# Patient Record
Sex: MALE | URBAN - METROPOLITAN AREA
[De-identification: names, ages, dates, MRNs, and addresses within clinical notes are randomized per-mention and may not be internally consistent; named-entity substitution may affect disease eponyms.]

---

## 2022-02-08 ENCOUNTER — OFFICE VISIT (OUTPATIENT)
Dept: URBAN - METROPOLITAN AREA CLINIC 70 | Facility: CLINIC | Age: 62
End: 2022-02-08

## 2022-02-11 ENCOUNTER — OFFICE VISIT (OUTPATIENT)
Dept: URBAN - METROPOLITAN AREA CLINIC 70 | Facility: CLINIC | Age: 62
End: 2022-02-11

## 2022-02-11 RX ORDER — PANTOPRAZOLE SODIUM 40 MG/1
1 TABLET TABLET, DELAYED RELEASE ORAL ONCE A DAY
Status: ACTIVE | COMMUNITY

## 2022-02-11 RX ORDER — SUCRALFATE 1 G
1 TABLET ON AN EMPTY STOMACH TABLET ORAL TWICE A DAY
Status: ACTIVE | COMMUNITY

## 2022-02-11 RX ORDER — OMEPRAZOLE 20 MG/1
1 CAPSULE 30 MINUTES BEFORE MORNING MEAL CAPSULE, DELAYED RELEASE ORAL ONCE A DAY
Status: ACTIVE | COMMUNITY

## 2022-02-11 NOTE — HPI-TODAY'S VISIT:
Patient was referred by Hosea Frias MD for an evaluation of abdominal pain and colon cancer screening. A copy of this note will be sent to the referring provider.  Labs 10/28/2021 showed Amylase 133 (HIGH).  11/10/2021 showed amylase 158 (HIGH). 1/20/2022 showed mylase 165 (HIGH).

## 2022-02-23 ENCOUNTER — LAB OUTSIDE AN ENCOUNTER (OUTPATIENT)
Dept: URBAN - METROPOLITAN AREA CLINIC 70 | Facility: CLINIC | Age: 62
End: 2022-02-23

## 2022-02-23 ENCOUNTER — CLAIMS CREATED FROM THE CLAIM WINDOW (OUTPATIENT)
Dept: URBAN - METROPOLITAN AREA CLINIC 70 | Facility: CLINIC | Age: 62
End: 2022-02-23
Payer: COMMERCIAL

## 2022-02-23 ENCOUNTER — WEB ENCOUNTER (OUTPATIENT)
Dept: URBAN - METROPOLITAN AREA CLINIC 70 | Facility: CLINIC | Age: 62
End: 2022-02-23

## 2022-02-23 VITALS
WEIGHT: 144.7 LBS | BODY MASS INDEX: 23.25 KG/M2 | HEART RATE: 56 BPM | DIASTOLIC BLOOD PRESSURE: 84 MMHG | HEIGHT: 66 IN | TEMPERATURE: 97.9 F | SYSTOLIC BLOOD PRESSURE: 146 MMHG

## 2022-02-23 DIAGNOSIS — Z12.11 COLON CANCER SCREENING: ICD-10-CM

## 2022-02-23 DIAGNOSIS — R74.8 ELEVATED AMYLASE: ICD-10-CM

## 2022-02-23 DIAGNOSIS — R10.13 EPIGASTRIC ABDOMINAL PAIN: ICD-10-CM

## 2022-02-23 PROBLEM — 275739007: Status: ACTIVE | Noted: 2022-02-23

## 2022-02-23 PROCEDURE — 99203 OFFICE O/P NEW LOW 30 MIN: CPT

## 2022-02-23 RX ORDER — PANTOPRAZOLE SODIUM 40 MG/1
1 TABLET TABLET, DELAYED RELEASE ORAL ONCE A DAY
Status: ACTIVE | COMMUNITY

## 2022-02-23 RX ORDER — PANTOPRAZOLE SODIUM 40 MG/1
TAKE 1 TABLET IN THE MORNING ON AN EMPTY STOMACH, WAIT 30 MINUTES, THEN START EATING TABLET, DELAYED RELEASE ORAL ONCE A DAY
Qty: 90 | Refills: 3

## 2022-02-23 RX ORDER — SUCRALFATE 1 G/1
TAKE ONE TABLET BY MOUTH THREE TIMES DAILY DIAGNOSIS UNAVAILABLE TABLET ORAL
OUTPATIENT

## 2022-02-23 RX ORDER — SUCRALFATE 1 G/1
TAKE ONE TABLET BY MOUTH THREE TIMES DAILY DIAGNOSIS UNAVAILABLE TABLET ORAL
Qty: 90 | Refills: 0 | Status: ACTIVE | COMMUNITY

## 2022-02-23 RX ORDER — PRAVASTATIN SODIUM 40 MG/1
TAKE ONE TABLET BY MOUTH AT BEDTIME DIAGNOSIS UNAVAILABLE TABLET ORAL
Qty: 90 | Refills: 0 | Status: ACTIVE | COMMUNITY

## 2022-02-23 NOTE — HPI-TODAY'S VISIT:
Patient was referred by Hosea Frias MD for an evaluation of abdominal pain and colon cancer screening. A copy of this note will be sent to the referring provider.  Pt presents for epigastric abdominal pain.  Labs 10/28/2021 showed Amylase 133 (HIGH).  11/10/2021 showed amylase 158 (HIGH). 1/20/2022 showed Amylase 165 (HIGH). Lipase, triglycerides, and LFTs all WNL.  CT 11/16/2021 showed a 1.2 flash filing hepatic hemangioma on the right hepatic lobe. Pancreas and gallbladder were unremarkable.  Today pt states pantoprazole and sucralfate are mildly improving symptoms.  He has been taking his PPI at night.  He states that dairy products trigger burning epigastric abdominal pain.  He has lost 30lbs since the onset of symptoms in 11/2021.  He denies dysphagia, nausea, vomiting, hematemesis, melena, or associated regurgitation.   He has never had a colonoscopy.  He denies a fhx of colon cancer, constipation, diarrhea, or rectal bleeding.  Of note, pt speaks Beninese with very little English.  His son is present for the office visit and provides the entire history and translation for the pt.

## 2022-02-23 NOTE — PHYSICAL EXAM GASTROINTESTINAL
Abdomen,  soft, moderate epigastric TTP, nondistended,  no guarding or rigidity,  no masses palpable,  normal bowel sounds

## 2022-03-29 ENCOUNTER — TELEPHONE ENCOUNTER (OUTPATIENT)
Dept: URBAN - METROPOLITAN AREA CLINIC 92 | Facility: CLINIC | Age: 62
End: 2022-03-29

## 2022-03-29 ENCOUNTER — OFFICE VISIT (OUTPATIENT)
Dept: URBAN - METROPOLITAN AREA SURGERY CENTER 24 | Facility: SURGERY CENTER | Age: 62
End: 2022-03-29
Payer: COMMERCIAL

## 2022-03-29 DIAGNOSIS — D12.5 ADENOMA OF SIGMOID COLON: ICD-10-CM

## 2022-03-29 DIAGNOSIS — K21.9 ACID REFLUX: ICD-10-CM

## 2022-03-29 DIAGNOSIS — K29.60 ADENOPAPILLOMATOSIS GASTRICA: ICD-10-CM

## 2022-03-29 DIAGNOSIS — Z12.11 COLON CANCER SCREENING: ICD-10-CM

## 2022-03-29 PROCEDURE — G8907 PT DOC NO EVENTS ON DISCHARG: HCPCS | Performed by: INTERNAL MEDICINE

## 2022-03-29 PROCEDURE — 43239 EGD BIOPSY SINGLE/MULTIPLE: CPT | Performed by: INTERNAL MEDICINE

## 2022-03-29 PROCEDURE — 45385 COLONOSCOPY W/LESION REMOVAL: CPT | Performed by: INTERNAL MEDICINE

## 2022-03-29 RX ORDER — PRAVASTATIN SODIUM 40 MG/1
TAKE ONE TABLET BY MOUTH AT BEDTIME DIAGNOSIS UNAVAILABLE TABLET ORAL
Qty: 90 | Refills: 0 | Status: ACTIVE | COMMUNITY

## 2022-03-29 RX ORDER — PANTOPRAZOLE SODIUM 40 MG/1
1 TABLET TABLET, DELAYED RELEASE ORAL ONCE A DAY
Qty: 90 TABLET | Refills: 0 | OUTPATIENT
Start: 2022-03-29

## 2022-03-29 RX ORDER — SUCRALFATE 1 G/1
TAKE ONE TABLET BY MOUTH THREE TIMES DAILY DIAGNOSIS UNAVAILABLE TABLET ORAL
Status: ACTIVE | COMMUNITY

## 2022-03-29 RX ORDER — PANTOPRAZOLE SODIUM 40 MG/1
TAKE 1 TABLET IN THE MORNING ON AN EMPTY STOMACH, WAIT 30 MINUTES, THEN START EATING TABLET, DELAYED RELEASE ORAL ONCE A DAY
Qty: 90 | Refills: 3 | Status: ACTIVE | COMMUNITY

## 2022-04-13 ENCOUNTER — OFFICE VISIT (OUTPATIENT)
Dept: URBAN - METROPOLITAN AREA CLINIC 70 | Facility: CLINIC | Age: 62
End: 2022-04-13
Payer: COMMERCIAL

## 2022-04-13 VITALS
HEART RATE: 56 BPM | TEMPERATURE: 97.5 F | HEIGHT: 66 IN | DIASTOLIC BLOOD PRESSURE: 83 MMHG | BODY MASS INDEX: 23.69 KG/M2 | SYSTOLIC BLOOD PRESSURE: 134 MMHG | WEIGHT: 147.4 LBS

## 2022-04-13 DIAGNOSIS — K21.00 GASTROESOPHAGEAL REFLUX DISEASE WITH ESOPHAGITIS WITHOUT HEMORRHAGE: ICD-10-CM

## 2022-04-13 PROCEDURE — 99213 OFFICE O/P EST LOW 20 MIN: CPT

## 2022-04-13 RX ORDER — PANTOPRAZOLE SODIUM 40 MG/1
TAKE 1 TABLET IN THE MORNING ON AN EMPTY STOMACH, WAIT 30 MINUTES, THEN START EATING TABLET, DELAYED RELEASE ORAL ONCE A DAY
Qty: 90 | Refills: 3 | Status: ACTIVE | COMMUNITY

## 2022-04-13 RX ORDER — SUCRALFATE 1 G/1
TAKE ONE TABLET BY MOUTH THREE TIMES DAILY DIAGNOSIS UNAVAILABLE TABLET ORAL
OUTPATIENT

## 2022-04-13 RX ORDER — SUCRALFATE 1 G/1
TAKE ONE TABLET BY MOUTH THREE TIMES DAILY DIAGNOSIS UNAVAILABLE TABLET ORAL
Status: ACTIVE | COMMUNITY

## 2022-04-13 RX ORDER — PRAVASTATIN SODIUM 40 MG/1
TAKE ONE TABLET BY MOUTH AT BEDTIME DIAGNOSIS UNAVAILABLE TABLET ORAL
Qty: 90 | Refills: 0 | Status: ACTIVE | COMMUNITY

## 2022-04-13 RX ORDER — PANTOPRAZOLE SODIUM 40 MG/1
TAKE 1 TABLET IN THE MORNING ON AN EMPTY STOMACH, WAIT 30 MINUTES, THEN START EATING TABLET, DELAYED RELEASE ORAL ONCE A DAY
OUTPATIENT

## 2022-04-13 NOTE — HPI-TODAY'S VISIT:
Pt presents for a procedure f/u.  EGD 3/29/2022 showed LA grade A esophagitis, small hiatal hernia, and gastritis. Neg. for h. pylori or dysplasia. Colon 3/29/2022 showed a 8mm TA in the sigmoid colon.  Recall 5 years (2027). Today he states he is feeling well.  Indigestion and epigastric abdominal pain have improved with Pantoprazole and carafate.  He denies dysphagia, nausea, vomiting, hematemesis, melena, constipation or diarrhea.   His son is present for the OV and is the  for the pt.

## 2022-04-13 NOTE — PHYSICAL EXAM GASTROINTESTINAL
Abdomen,  soft, mild epigastric TTP, nondistended,  no guarding or rigidity,  no masses palpable,  normal bowel sounds

## 2022-04-13 NOTE — HPI-OTHER HISTORIES
OV from 2/23/2022:  Patient was referred by Hosea Frias MD for an evaluation of abdominal pain and colon cancer screening. A copy of this note will be sent to the referring provider.  Pt presents for epigastric abdominal pain.  Labs 10/28/2021 showed Amylase 133 (HIGH).  11/10/2021 showed amylase 158 (HIGH). 1/20/2022 showed Amylase 165 (HIGH). Lipase, triglycerides, and LFTs all WNL.  CT 11/16/2021 showed a 1.2 flash filing hepatic hemangioma on the right hepatic lobe. Pancreas and gallbladder were unremarkable.  Today pt states pantoprazole and sucralfate are mildly improving symptoms.  He has been taking his PPI at night.  He states that dairy products trigger burning epigastric abdominal pain.  He has lost 30lbs since the onset of symptoms in 11/2021.  He denies dysphagia, nausea, vomiting, hematemesis, melena, or associated regurgitation.   He has never had a colonoscopy.  He denies a fhx of colon cancer, constipation, diarrhea, or rectal bleeding.  Of note, pt speaks Turkmen with very little English.  His son is present for the office visit and provides the entire history and translation for the pt.

## 2022-08-11 ENCOUNTER — TELEPHONE ENCOUNTER (OUTPATIENT)
Dept: URBAN - METROPOLITAN AREA CLINIC 70 | Facility: CLINIC | Age: 62
End: 2022-08-11

## 2022-08-11 RX ORDER — PANTOPRAZOLE SODIUM 40 MG/1
TAKE 1 TABLET IN THE MORNING ON AN EMPTY STOMACH, WAIT 30 MINUTES, THEN START EATING TABLET, DELAYED RELEASE ORAL ONCE A DAY
Qty: 90 | Refills: 3

## 2022-08-12 ENCOUNTER — OFFICE VISIT (OUTPATIENT)
Dept: URBAN - METROPOLITAN AREA CLINIC 70 | Facility: CLINIC | Age: 62
End: 2022-08-12

## 2022-09-07 ENCOUNTER — OFFICE VISIT (OUTPATIENT)
Dept: URBAN - METROPOLITAN AREA CLINIC 70 | Facility: CLINIC | Age: 62
End: 2022-09-07

## 2022-09-07 ENCOUNTER — OFFICE VISIT (OUTPATIENT)
Dept: URBAN - METROPOLITAN AREA CLINIC 70 | Facility: CLINIC | Age: 62
End: 2022-09-07
Payer: COMMERCIAL

## 2022-09-07 VITALS
WEIGHT: 157 LBS | HEIGHT: 66 IN | TEMPERATURE: 98.7 F | SYSTOLIC BLOOD PRESSURE: 131 MMHG | DIASTOLIC BLOOD PRESSURE: 87 MMHG | HEART RATE: 55 BPM | BODY MASS INDEX: 25.23 KG/M2

## 2022-09-07 DIAGNOSIS — K21.00 GASTROESOPHAGEAL REFLUX DISEASE WITH ESOPHAGITIS WITHOUT HEMORRHAGE: ICD-10-CM

## 2022-09-07 PROBLEM — 266433003: Status: ACTIVE | Noted: 2022-04-13

## 2022-09-07 PROCEDURE — 99214 OFFICE O/P EST MOD 30 MIN: CPT

## 2022-09-07 RX ORDER — PANTOPRAZOLE SODIUM 40 MG/1
TAKE 1 TABLET IN THE MORNING ON AN EMPTY STOMACH, WAIT 30 MINUTES, THEN START EATING TABLET, DELAYED RELEASE ORAL ONCE A DAY
Qty: 90 | Refills: 3 | Status: ACTIVE | COMMUNITY

## 2022-09-07 RX ORDER — SUCRALFATE 1 G/1
TAKE ONE TABLET BY MOUTH THREE TIMES DAILY DIAGNOSIS UNAVAILABLE TABLET ORAL
COMMUNITY

## 2022-09-07 RX ORDER — PANTOPRAZOLE SODIUM 40 MG/1
TAKE 1 TABLET IN THE MORNING ON AN EMPTY STOMACH, WAIT 30 MINUTES, THEN START EATING TABLET, DELAYED RELEASE ORAL ONCE A DAY
OUTPATIENT

## 2022-09-07 RX ORDER — PANTOPRAZOLE SODIUM 40 MG/1
TAKE 1 TABLET IN THE MORNING ON AN EMPTY STOMACH, WAIT 30 MINUTES, THEN START EATING TABLET, DELAYED RELEASE ORAL ONCE A DAY
Qty: 90 | Refills: 3 | COMMUNITY

## 2022-09-07 RX ORDER — PRAVASTATIN SODIUM 40 MG/1
TAKE ONE TABLET BY MOUTH AT BEDTIME DIAGNOSIS UNAVAILABLE TABLET ORAL
Qty: 90 | Refills: 0 | COMMUNITY

## 2022-09-07 NOTE — HPI-OTHER HISTORIES
OV 4/13/2022: Pt presents for a procedure f/u.  EGD 3/29/2022 showed LA grade A esophagitis, small hiatal hernia, and gastritis. Neg. for h. pylori or dysplasia. Colon 3/29/2022 showed a 8mm TA in the sigmoid colon.  Recall 5 years (2027). Today he states he is feeling well.  Indigestion and epigastric abdominal pain have improved with Pantoprazole and carafate.  He denies dysphagia, nausea, vomiting, hematemesis, melena, constipation or diarrhea.   His son is present for the OV and is the  for the pt. ------------------------------------------------- OV from 2/23/2022:  Patient was referred by Hosea Frias MD for an evaluation of abdominal pain and colon cancer screening. A copy of this note will be sent to the referring provider.  Pt presents for epigastric abdominal pain.  Labs 10/28/2021 showed Amylase 133 (HIGH).  11/10/2021 showed amylase 158 (HIGH). 1/20/2022 showed Amylase 165 (HIGH). Lipase, triglycerides, and LFTs all WNL.  CT 11/16/2021 showed a 1.2 flash filing hepatic hemangioma on the right hepatic lobe. Pancreas and gallbladder were unremarkable.  Today pt states pantoprazole and sucralfate are mildly improving symptoms.  He has been taking his PPI at night.  He states that dairy products trigger burning epigastric abdominal pain.  He has lost 30lbs since the onset of symptoms in 11/2021.  He denies dysphagia, nausea, vomiting, hematemesis, melena, or associated regurgitation.   He has never had a colonoscopy.  He denies a fhx of colon cancer, constipation, diarrhea, or rectal bleeding.  Of note, pt speaks Kazakh with very little English.  His son is present for the office visit and provides the entire history and translation for the pt.

## 2022-09-07 NOTE — HPI-TODAY'S VISIT:
The pt presents for a f/u for GERD.  He presents with his son who translates for the pt.  Today he states GERD symptoms are well managed with Pantoprazole 40mg.  He denies dysphagia, nausea, vomiting, or regurgitation.

## 2023-12-11 ENCOUNTER — OFFICE VISIT (OUTPATIENT)
Dept: URBAN - METROPOLITAN AREA CLINIC 70 | Facility: CLINIC | Age: 63
End: 2023-12-11
Payer: COMMERCIAL

## 2023-12-11 ENCOUNTER — LAB OUTSIDE AN ENCOUNTER (OUTPATIENT)
Dept: URBAN - METROPOLITAN AREA CLINIC 70 | Facility: CLINIC | Age: 63
End: 2023-12-11

## 2023-12-11 VITALS
SYSTOLIC BLOOD PRESSURE: 150 MMHG | HEART RATE: 58 BPM | TEMPERATURE: 97.7 F | BODY MASS INDEX: 24.33 KG/M2 | DIASTOLIC BLOOD PRESSURE: 92 MMHG | WEIGHT: 151.4 LBS | HEIGHT: 66 IN

## 2023-12-11 DIAGNOSIS — R10.13 EPIGASTRIC PAIN: ICD-10-CM

## 2023-12-11 DIAGNOSIS — K21.00 GASTROESOPHAGEAL REFLUX DISEASE WITH ESOPHAGITIS WITHOUT HEMORRHAGE: ICD-10-CM

## 2023-12-11 DIAGNOSIS — Z86.010 HISTORY OF COLON POLYPS: ICD-10-CM

## 2023-12-11 PROBLEM — 428283002: Status: ACTIVE | Noted: 2023-12-11

## 2023-12-11 PROCEDURE — 99214 OFFICE O/P EST MOD 30 MIN: CPT | Performed by: NURSE PRACTITIONER

## 2023-12-11 RX ORDER — FENOFIBRATE 145 MG/1
1 TABLET TABLET, FILM COATED ORAL ONCE A DAY
Status: ACTIVE | COMMUNITY

## 2023-12-11 RX ORDER — PANTOPRAZOLE SODIUM 40 MG/1
TAKE 1 TABLET IN THE MORNING ON AN EMPTY STOMACH, WAIT 30 MINUTES, THEN START EATING TABLET, DELAYED RELEASE ORAL ONCE A DAY
Status: ACTIVE | COMMUNITY

## 2023-12-11 RX ORDER — PANTOPRAZOLE SODIUM 40 MG/1
1 TABLET TABLET, DELAYED RELEASE ORAL TWICE A DAY
Qty: 180 TABLET | Refills: 3

## 2023-12-11 RX ORDER — PRAVASTATIN SODIUM 40 MG/1
TAKE ONE TABLET BY MOUTH AT BEDTIME DIAGNOSIS UNAVAILABLE TABLET ORAL
Qty: 90 | Refills: 0 | Status: ACTIVE | COMMUNITY

## 2023-12-11 NOTE — HPI-TODAY'S VISIT:
OV from 2/23/2022 Karin RICHTER: Patient was referred by Hosea Frias MD for an evaluation of abdominal pain and colon cancer screening. A copy of this note will be sent to the referring provider. Pt presents for epigastric abdominal pain. Labs 10/28/2021 showed Amylase 133 (HIGH). 11/10/2021 showed amylase 158 (HIGH). 1/20/2022 showed Amylase 165 (HIGH). Lipase, triglycerides, and LFTs all WNL. CT 11/16/2021 showed a 1.2 flash filing hepatic hemangioma on the right hepatic lobe. Pancreas and gallbladder were unremarkable. Today pt states pantoprazole and sucralfate are mildly improving symptoms. He has been taking his PPI at night. He states that dairy products trigger burning epigastric abdominal pain. He has lost 30lbs since the onset of symptoms in 11/2021. He denies dysphagia, nausea, vomiting, hematemesis, melena, or associated regurgitation. He has never had a colonoscopy. He denies a fhx of colon cancer, constipation, diarrhea, or rectal bleeding. Of note, pt speaks Swiss with very little English. His son is present for the office visit and provides the entire history and translation for the pt. ---------------------------------------- OV 4/13/2022 Karin RICHTER: Pt presents for a procedure f/u. EGD 3/29/2022 showed LA grade A esophagitis, small hiatal hernia, and gastritis. Neg. for h. pylori or dysplasia. Colon 3/29/2022 showed a 8mm TA in the sigmoid colon. Recall 5 years (2027). Today he states he is feeling well. Indigestion and epigastric abdominal pain have improved with Pantoprazole and carafate. He denies dysphagia, nausea, vomiting, hematemesis, melena, constipation or diarrhea. His son is present for the OV and is the  for the pt. ------------------------------------------------- OV 9/7/22 Karin RICHTER: The pt presents for a f/u for GERD.  He presents with his son who translates for the pt.  Today he states GERD symptoms are well managed with Pantoprazole 40mg.  He denies dysphagia, nausea, vomiting, or regurgitation. ---------------------------- Today 12/11/23: Patient presents today accompanied by his son for epigastric pain. Pain has been present x 2 years. CT 2021 unremarkable. EGD showing GERD however pain has not improved on PPI. Pain is postprandial. No alcohol or NSAIDs. Denies fever, N/V, dysphagia, wt loss, LGI symptoms, or signs of GI bleeding.

## 2023-12-29 ENCOUNTER — TELEPHONE ENCOUNTER (OUTPATIENT)
Dept: URBAN - METROPOLITAN AREA CLINIC 70 | Facility: CLINIC | Age: 63
End: 2023-12-29

## 2024-01-02 ENCOUNTER — TELEPHONE ENCOUNTER (OUTPATIENT)
Dept: URBAN - METROPOLITAN AREA CLINIC 70 | Facility: CLINIC | Age: 64
End: 2024-01-02

## 2024-01-22 ENCOUNTER — OFFICE VISIT (OUTPATIENT)
Dept: URBAN - METROPOLITAN AREA CLINIC 70 | Facility: CLINIC | Age: 64
End: 2024-01-22
Payer: COMMERCIAL

## 2024-01-22 VITALS
TEMPERATURE: 97.6 F | BODY MASS INDEX: 23.59 KG/M2 | DIASTOLIC BLOOD PRESSURE: 85 MMHG | SYSTOLIC BLOOD PRESSURE: 145 MMHG | HEIGHT: 66 IN | HEART RATE: 54 BPM | WEIGHT: 146.8 LBS

## 2024-01-22 DIAGNOSIS — K21.00 GASTROESOPHAGEAL REFLUX DISEASE WITH ESOPHAGITIS WITHOUT HEMORRHAGE: ICD-10-CM

## 2024-01-22 DIAGNOSIS — Z86.010 HISTORY OF COLON POLYPS: ICD-10-CM

## 2024-01-22 DIAGNOSIS — K82.8 BILIARY DYSKINESIA: ICD-10-CM

## 2024-01-22 DIAGNOSIS — R10.13 EPIGASTRIC PAIN: ICD-10-CM

## 2024-01-22 PROCEDURE — 99214 OFFICE O/P EST MOD 30 MIN: CPT | Performed by: NURSE PRACTITIONER

## 2024-01-22 RX ORDER — FENOFIBRATE 145 MG/1
1 TABLET TABLET, FILM COATED ORAL ONCE A DAY
Status: ACTIVE | COMMUNITY

## 2024-01-22 RX ORDER — PANTOPRAZOLE SODIUM 40 MG/1
1 TABLET TABLET, DELAYED RELEASE ORAL ONCE A DAY
OUTPATIENT

## 2024-01-22 RX ORDER — PRAVASTATIN SODIUM 40 MG/1
TAKE ONE TABLET BY MOUTH AT BEDTIME DIAGNOSIS UNAVAILABLE TABLET ORAL
Qty: 90 | Refills: 0 | Status: ON HOLD | COMMUNITY

## 2024-01-22 RX ORDER — PANTOPRAZOLE SODIUM 40 MG/1
1 TABLET TABLET, DELAYED RELEASE ORAL TWICE A DAY
Qty: 180 TABLET | Refills: 3 | Status: ON HOLD | COMMUNITY

## 2024-01-22 NOTE — HPI-TODAY'S VISIT:
OV from 2/23/2022 Karin RICHTER: Patient was referred by Hosea Frias MD for an evaluation of abdominal pain and colon cancer screening. A copy of this note will be sent to the referring provider. Pt presents for epigastric abdominal pain. Labs 10/28/2021 showed Amylase 133 (HIGH). 11/10/2021 showed amylase 158 (HIGH). 1/20/2022 showed Amylase 165 (HIGH). Lipase, triglycerides, and LFTs all WNL. CT 11/16/2021 showed a 1.2 flash filing hepatic hemangioma on the right hepatic lobe. Pancreas and gallbladder were unremarkable. Today pt states pantoprazole and sucralfate are mildly improving symptoms. He has been taking his PPI at night. He states that dairy products trigger burning epigastric abdominal pain. He has lost 30lbs since the onset of symptoms in 11/2021. He denies dysphagia, nausea, vomiting, hematemesis, melena, or associated regurgitation. He has never had a colonoscopy. He denies a fhx of colon cancer, constipation, diarrhea, or rectal bleeding. Of note, pt speaks Sierra Leonean with very little English. His son is present for the office visit and provides the entire history and translation for the pt. ---------------------------------------- OV 4/13/2022 Karin RICHTER: Pt presents for a procedure f/u. EGD 3/29/2022 showed LA grade A esophagitis, small hiatal hernia, and gastritis. Neg. for h. pylori or dysplasia. Colon 3/29/2022 showed a 8mm TA in the sigmoid colon. Recall 5 years (2027). Today he states he is feeling well. Indigestion and epigastric abdominal pain have improved with Pantoprazole and carafate. He denies dysphagia, nausea, vomiting, hematemesis, melena, constipation or diarrhea. His son is present for the OV and is the  for the pt. ------------------------------------------------- OV 9/7/22 Karin RICHTER: The pt presents for a f/u for GERD.  He presents with his son who translates for the pt.  Today he states GERD symptoms are well managed with Pantoprazole 40mg.  He denies dysphagia, nausea, vomiting, or regurgitation. ---------------------------- OV 12/11/23: Patient presents today accompanied by his son for epigastric pain. Pain has been present x 2 years. CT 2021 unremarkable. EGD showing GERD however pain has not improved on PPI. Pain is postprandial. No alcohol or NSAIDs. Denies fever, N/V, dysphagia, wt loss, LGI symptoms, or signs of GI bleeding. ---------------------------- Today 1/22/24: Patient presents today for follow up accomanied by tim to assist with translation. Abdominal u/s 12/11/23 showed mild fatty liver (gallbladder normal). HIDA scan 12/19/23 showed biliary dyskinesia with EF 24%. Results discussed with patient/family. Prior referral was placed to surgery (Dr Schuyler Jorge). Depite inceasing PPI to BID pt still has epigastric pain. No new GI complaints. Denies fever, N/V, wt loss, or LGI symptoms.

## 2024-04-23 ENCOUNTER — OV EP (OUTPATIENT)
Dept: URBAN - METROPOLITAN AREA CLINIC 70 | Facility: CLINIC | Age: 64
End: 2024-04-23
Payer: COMMERCIAL

## 2024-04-23 ENCOUNTER — LAB (OUTPATIENT)
Dept: URBAN - METROPOLITAN AREA CLINIC 70 | Facility: CLINIC | Age: 64
End: 2024-04-23

## 2024-04-23 VITALS
BODY MASS INDEX: 22.21 KG/M2 | TEMPERATURE: 97.6 F | DIASTOLIC BLOOD PRESSURE: 83 MMHG | HEIGHT: 66 IN | WEIGHT: 138.2 LBS | HEART RATE: 55 BPM | SYSTOLIC BLOOD PRESSURE: 121 MMHG

## 2024-04-23 DIAGNOSIS — K21.00 GASTROESOPHAGEAL REFLUX DISEASE WITH ESOPHAGITIS WITHOUT HEMORRHAGE: ICD-10-CM

## 2024-04-23 DIAGNOSIS — R10.13 EPIGASTRIC PAIN: ICD-10-CM

## 2024-04-23 DIAGNOSIS — K82.8 BILIARY DYSKINESIA: ICD-10-CM

## 2024-04-23 DIAGNOSIS — Z86.010 HISTORY OF COLON POLYPS: ICD-10-CM

## 2024-04-23 PROCEDURE — 99214 OFFICE O/P EST MOD 30 MIN: CPT | Performed by: NURSE PRACTITIONER

## 2024-04-23 RX ORDER — PRAVASTATIN SODIUM 40 MG/1
TAKE ONE TABLET BY MOUTH AT BEDTIME DIAGNOSIS UNAVAILABLE TABLET ORAL
Qty: 90 | Refills: 0 | Status: ON HOLD | COMMUNITY

## 2024-04-23 RX ORDER — PANTOPRAZOLE SODIUM 40 MG/1
1 TABLET TABLET, DELAYED RELEASE ORAL ONCE A DAY
Status: ACTIVE | COMMUNITY

## 2024-04-23 RX ORDER — PANTOPRAZOLE SODIUM 40 MG/1
1 TABLET TABLET, DELAYED RELEASE ORAL ONCE A DAY
OUTPATIENT

## 2024-04-23 RX ORDER — FENOFIBRATE 145 MG/1
1 TABLET TABLET, FILM COATED ORAL ONCE A DAY
Status: ACTIVE | COMMUNITY

## 2024-04-23 NOTE — HPI-TODAY'S VISIT:
OV from 2/23/2022 Karin RICHTER: Patient was referred by Hosea Frias MD for an evaluation of abdominal pain and colon cancer screening. A copy of this note will be sent to the referring provider. Pt presents for epigastric abdominal pain. Labs 10/28/2021 showed Amylase 133 (HIGH). 11/10/2021 showed amylase 158 (HIGH). 1/20/2022 showed Amylase 165 (HIGH). Lipase, triglycerides, and LFTs all WNL. CT 11/16/2021 showed a 1.2 flash filing hepatic hemangioma on the right hepatic lobe. Pancreas and gallbladder were unremarkable. Today pt states pantoprazole and sucralfate are mildly improving symptoms. He has been taking his PPI at night. He states that dairy products trigger burning epigastric abdominal pain. He has lost 30lbs since the onset of symptoms in 11/2021. He denies dysphagia, nausea, vomiting, hematemesis, melena, or associated regurgitation. He has never had a colonoscopy. He denies a fhx of colon cancer, constipation, diarrhea, or rectal bleeding. Of note, pt speaks Honduran with very little English. His son is present for the office visit and provides the entire history and translation for the pt. ---------------------------------------- OV 4/13/2022 Karin RICHTER: Pt presents for a procedure f/u. EGD 3/29/2022 showed LA grade A esophagitis, small hiatal hernia, and gastritis. Neg. for h. pylori or dysplasia. Colon 3/29/2022 showed a 8mm TA in the sigmoid colon. Recall 5 years (2027). Today he states he is feeling well. Indigestion and epigastric abdominal pain have improved with Pantoprazole and carafate. He denies dysphagia, nausea, vomiting, hematemesis, melena, constipation or diarrhea. His son is present for the OV and is the  for the pt. ------------------------------------------------- OV 9/7/22 Karin RICHTER: The pt presents for a f/u for GERD.  He presents with his son who translates for the pt.  Today he states GERD symptoms are well managed with Pantoprazole 40mg.  He denies dysphagia, nausea, vomiting, or regurgitation. ---------------------------- OV 12/11/23: Patient presents today accompanied by his son for epigastric pain. Pain has been present x 2 years. CT 2021 unremarkable. EGD showing GERD however pain has not improved on PPI. Pain is postprandial. No alcohol or NSAIDs. Denies fever, N/V, dysphagia, wt loss, LGI symptoms, or signs of GI bleeding. ---------------------------- OV 1/22/24: Patient presents today for follow up accomanied by tim to assist with translation. Abdominal u/s 12/11/23 showed mild fatty liver (gallbladder normal). HIDA scan 12/19/23 showed biliary dyskinesia with EF 24%. Results discussed with patient/family. Prior referral was placed to surgery (Dr Schuyler Jorge). Depite inceasing PPI to BID pt still has epigastric pain. No new GI complaints. Denies fever, N/V, wt loss, or LGI symptoms. ------------------------- Today 4/23/24: Patient presents today with c/o abdominal pain accompanied by family to assist with translation. He underwent CCY 3/26/24. Admits to having acute constipation from taking pain medication but took OTC laxative with constipation now resolved but c/o continued upper abdominal discomfort. He was seen at urgent care for pain 4/12/23 with labs showing WBC, H/H, and lipase normal but LFTs elevated (AST 49, ALT 46, and alk phos 147). Denies fever, N/V, jaundice, or signs of GI bleeding.

## 2024-04-23 NOTE — PHYSICAL EXAM CHEST:
no deformities , breathing is unlabored without accessory muscle use , normal breath sounds.
0 = independent

## 2024-05-09 ENCOUNTER — TELEPHONE ENCOUNTER (OUTPATIENT)
Dept: URBAN - METROPOLITAN AREA CLINIC 70 | Facility: CLINIC | Age: 64
End: 2024-05-09

## 2024-05-09 ENCOUNTER — LAB OUTSIDE AN ENCOUNTER (OUTPATIENT)
Dept: URBAN - METROPOLITAN AREA CLINIC 70 | Facility: CLINIC | Age: 64
End: 2024-05-09

## 2024-05-09 PROBLEM — 235931005: Status: ACTIVE | Noted: 2024-05-09

## 2024-05-13 ENCOUNTER — LAB OUTSIDE AN ENCOUNTER (OUTPATIENT)
Dept: URBAN - METROPOLITAN AREA CLINIC 70 | Facility: CLINIC | Age: 64
End: 2024-05-13

## 2024-05-16 ENCOUNTER — TELEPHONE ENCOUNTER (OUTPATIENT)
Dept: URBAN - METROPOLITAN AREA CLINIC 70 | Facility: CLINIC | Age: 64
End: 2024-05-16

## 2024-05-17 ENCOUNTER — TELEPHONE ENCOUNTER (OUTPATIENT)
Dept: URBAN - METROPOLITAN AREA CLINIC 70 | Facility: CLINIC | Age: 64
End: 2024-05-17

## 2024-05-18 ENCOUNTER — CLAIMS CREATED FROM THE CLAIM WINDOW (OUTPATIENT)
Dept: URBAN - METROPOLITAN AREA MEDICAL CENTER 16 | Facility: MEDICAL CENTER | Age: 64
End: 2024-05-18
Payer: SELF-PAY

## 2024-05-18 DIAGNOSIS — R10.84 ABDOMINAL CRAMPING, GENERALIZED: ICD-10-CM

## 2024-05-18 DIAGNOSIS — Z90.49 ABSENCE OF GALLBLADDER: ICD-10-CM

## 2024-05-18 PROCEDURE — 99254 IP/OBS CNSLTJ NEW/EST MOD 60: CPT | Performed by: INTERNAL MEDICINE

## 2024-05-19 ENCOUNTER — CLAIMS CREATED FROM THE CLAIM WINDOW (OUTPATIENT)
Dept: URBAN - METROPOLITAN AREA MEDICAL CENTER 16 | Facility: MEDICAL CENTER | Age: 64
End: 2024-05-19
Payer: SELF-PAY

## 2024-05-19 DIAGNOSIS — Z90.49 ABSENCE OF GALLBLADDER: ICD-10-CM

## 2024-05-19 DIAGNOSIS — R10.84 ABDOMINAL CRAMPING, GENERALIZED: ICD-10-CM

## 2024-05-19 DIAGNOSIS — D64.89 ANEMIA DUE TO OTHER CAUSE: ICD-10-CM

## 2024-05-19 PROCEDURE — 99232 SBSQ HOSP IP/OBS MODERATE 35: CPT | Performed by: INTERNAL MEDICINE

## 2024-05-20 ENCOUNTER — CLAIMS CREATED FROM THE CLAIM WINDOW (OUTPATIENT)
Dept: URBAN - METROPOLITAN AREA MEDICAL CENTER 16 | Facility: MEDICAL CENTER | Age: 64
End: 2024-05-20
Payer: SELF-PAY

## 2024-05-20 DIAGNOSIS — K29.60 ADENOPAPILLOMATOSIS GASTRICA: ICD-10-CM

## 2024-05-20 PROCEDURE — 43235 EGD DIAGNOSTIC BRUSH WASH: CPT | Performed by: INTERNAL MEDICINE

## 2024-05-20 PROCEDURE — 43239 EGD BIOPSY SINGLE/MULTIPLE: CPT | Performed by: INTERNAL MEDICINE

## 2024-05-21 ENCOUNTER — TELEPHONE ENCOUNTER (OUTPATIENT)
Dept: URBAN - METROPOLITAN AREA CLINIC 70 | Facility: CLINIC | Age: 64
End: 2024-05-21

## 2024-05-22 ENCOUNTER — OFFICE VISIT (OUTPATIENT)
Dept: URBAN - METROPOLITAN AREA CLINIC 70 | Facility: CLINIC | Age: 64
End: 2024-05-22

## 2024-05-22 LAB
ABSOLUTE BASOPHILS: 70
ABSOLUTE EOSINOPHILS: 133
ABSOLUTE LYMPHOCYTES: 1704
ABSOLUTE MONOCYTES: 382
ABSOLUTE NEUTROPHILS: 1611
ALBUMIN/GLOBULIN RATIO: 1.9
ALBUMIN: 3.9
ALKALINE PHOSPHATASE: 103
ALT: 27
AST: 36
BASOPHILS: 1.8
BILIRUBIN, TOTAL: 0.8
BUN/CREATININE RATIO: (no result)
CALCIUM: 9.7
CARBON DIOXIDE: 25
CHLORIDE: 103
CREATININE: 0.85
EGFR: 98
ENHANCED LIVER FIBROSIS (ELF) SCORE: 9.57
EOSINOPHILS: 3.4
FIB 4 INDEX: 1.73
FIB 4 INTERPRETATION: (no result)
GLOBULIN: 2.1
GLUCOSE: 79
HEMATOCRIT: 39.6
HEMOGLOBIN: 13.4
LYMPHOCYTES: 43.7
MCH: 27.9
MCHC: 33.8
MCV: 82.3
MONOCYTES: 9.8
MPV: 12
NEUTROPHILS: 41.3
PLATELET COUNT: 249
PLATELET COUNT: 249
POTASSIUM: 4.3
PROTEIN, TOTAL: 6
RDW: 13.8
RED BLOOD CELL COUNT: 4.81
SODIUM: 138
UREA NITROGEN (BUN): 15
WHITE BLOOD CELL COUNT: 3.9

## 2024-05-28 ENCOUNTER — OFFICE VISIT (OUTPATIENT)
Dept: URBAN - METROPOLITAN AREA MEDICAL CENTER 33 | Facility: MEDICAL CENTER | Age: 64
End: 2024-05-28

## 2024-06-03 ENCOUNTER — OFFICE VISIT (OUTPATIENT)
Dept: URBAN - METROPOLITAN AREA CLINIC 70 | Facility: CLINIC | Age: 64
End: 2024-06-03

## 2024-06-03 RX ORDER — PANTOPRAZOLE SODIUM 40 MG/1
1 TABLET TABLET, DELAYED RELEASE ORAL ONCE A DAY
Status: ACTIVE | COMMUNITY

## 2024-06-03 RX ORDER — PRAVASTATIN SODIUM 40 MG/1
TAKE ONE TABLET BY MOUTH AT BEDTIME DIAGNOSIS UNAVAILABLE TABLET ORAL
Qty: 90 | Refills: 0 | Status: ON HOLD | COMMUNITY

## 2024-06-03 RX ORDER — FENOFIBRATE 145 MG/1
1 TABLET TABLET, FILM COATED ORAL ONCE A DAY
Status: ACTIVE | COMMUNITY

## 2024-06-05 ENCOUNTER — OFFICE VISIT (OUTPATIENT)
Dept: URBAN - METROPOLITAN AREA CLINIC 70 | Facility: CLINIC | Age: 64
End: 2024-06-05

## 2024-06-11 ENCOUNTER — OFFICE VISIT (OUTPATIENT)
Dept: URBAN - METROPOLITAN AREA CLINIC 70 | Facility: CLINIC | Age: 64
End: 2024-06-11

## 2024-06-11 ENCOUNTER — DASHBOARD ENCOUNTERS (OUTPATIENT)
Age: 64
End: 2024-06-11

## 2024-06-11 ENCOUNTER — OFFICE VISIT (OUTPATIENT)
Dept: URBAN - METROPOLITAN AREA CLINIC 118 | Facility: CLINIC | Age: 64
End: 2024-06-11
Payer: COMMERCIAL

## 2024-06-11 VITALS
HEIGHT: 66 IN | WEIGHT: 132 LBS | DIASTOLIC BLOOD PRESSURE: 75 MMHG | HEART RATE: 58 BPM | TEMPERATURE: 99.1 F | SYSTOLIC BLOOD PRESSURE: 123 MMHG | BODY MASS INDEX: 21.21 KG/M2

## 2024-06-11 DIAGNOSIS — R10.13 EPIGASTRIC ABDOMINAL PAIN: ICD-10-CM

## 2024-06-11 DIAGNOSIS — R63.4 ABNORMAL WEIGHT LOSS: ICD-10-CM

## 2024-06-11 DIAGNOSIS — K83.9 BILE LEAK: ICD-10-CM

## 2024-06-11 PROBLEM — 79922009: Status: ACTIVE | Noted: 2024-06-11

## 2024-06-11 PROBLEM — 267024001: Status: ACTIVE | Noted: 2024-06-11

## 2024-06-11 PROCEDURE — 99215 OFFICE O/P EST HI 40 MIN: CPT | Performed by: INTERNAL MEDICINE

## 2024-06-11 RX ORDER — FENOFIBRATE 145 MG/1
1 TABLET TABLET, FILM COATED ORAL ONCE A DAY
Status: ACTIVE | COMMUNITY

## 2024-06-11 RX ORDER — PRAVASTATIN SODIUM 40 MG/1
TAKE ONE TABLET BY MOUTH AT BEDTIME DIAGNOSIS UNAVAILABLE TABLET ORAL
Qty: 90 | Refills: 0 | Status: ON HOLD | COMMUNITY

## 2024-06-11 RX ORDER — PANTOPRAZOLE SODIUM 40 MG/1
1 TABLET TABLET, DELAYED RELEASE ORAL ONCE A DAY
Status: ACTIVE | COMMUNITY

## 2024-06-11 NOTE — HPI-TODAY'S VISIT:
OV from 2/23/2022 Karin RICHTER: Patient was referred by Hosea Frias MD for an evaluation of abdominal pain and colon cancer screening. A copy of this note will be sent to the referring provider. Pt presents for epigastric abdominal pain. Labs 10/28/2021 showed Amylase 133 (HIGH). 11/10/2021 showed amylase 158 (HIGH). 1/20/2022 showed Amylase 165 (HIGH). Lipase, triglycerides, and LFTs all WNL. CT 11/16/2021 showed a 1.2 flash filing hepatic hemangioma on the right hepatic lobe. Pancreas and gallbladder were unremarkable. Today pt states pantoprazole and sucralfate are mildly improving symptoms. He has been taking his PPI at night. He states that dairy products trigger burning epigastric abdominal pain. He has lost 30lbs since the onset of symptoms in 11/2021. He denies dysphagia, nausea, vomiting, hematemesis, melena, or associated regurgitation. He has never had a colonoscopy. He denies a fhx of colon cancer, constipation, diarrhea, or rectal bleeding. Of note, pt speaks Maltese with very little English. His son is present for the office visit and provides the entire history and translation for the pt. ---------------------------------------- OV 4/13/2022 Karin RICHTER: Pt presents for a procedure f/u. EGD 3/29/2022 showed LA grade A esophagitis, small hiatal hernia, and gastritis. Neg. for h. pylori or dysplasia. Colon 3/29/2022 showed a 8mm TA in the sigmoid colon. Recall 5 years (2027). Today he states he is feeling well. Indigestion and epigastric abdominal pain have improved with Pantoprazole and carafate. He denies dysphagia, nausea, vomiting, hematemesis, melena, constipation or diarrhea. His son is present for the OV and is the  for the pt. ------------------------------------------------- OV 9/7/22 Karin RICHTER: The pt presents for a f/u for GERD.  He presents with his son who translates for the pt.  Today he states GERD symptoms are well managed with Pantoprazole 40mg.  He denies dysphagia, nausea, vomiting, or regurgitation. ---------------------------- OV 12/11/23: Patient presents today accompanied by his son for epigastric pain. Pain has been present x 2 years. CT 2021 unremarkable. EGD showing GERD however pain has not improved on PPI. Pain is postprandial. No alcohol or NSAIDs. Denies fever, N/V, dysphagia, wt loss, LGI symptoms, or signs of GI bleeding. ---------------------------- OV 1/22/24: Patient presents today for follow up accomanied by tim to assist with translation. Abdominal u/s 12/11/23 showed mild fatty liver (gallbladder normal). HIDA scan 12/19/23 showed biliary dyskinesia with EF 24%. Results discussed with patient/family. Prior referral was placed to surgery (Dr Schuyler Jorge). Depite inceasing PPI to BID pt still has epigastric pain. No new GI complaints. Denies fever, N/V, wt loss, or LGI symptoms. ------------------------- OV 4/23/24: Patient presents today with c/o abdominal pain accompanied by family to assist with translation. He underwent CCY 3/26/24. Admits to having acute constipation from taking pain medication but took OTC laxative with constipation now resolved but c/o continued upper abdominal discomfort. He was seen at urgent care for pain 4/12/23 with labs showing WBC, H/H, and lipase normal but LFTs elevated (AST 49, ALT 46, and alk phos 147). Denies fever, N/V, jaundice, or signs of GI bleeding. ------------------------- Today 6/11/24 - Pt here for follow up.  He was hosp at [facility] from 5/17 - 5/20/24 for pain, with negative CT, EGD and labs.  He had a HIDA done 5/9/24 init read as a bile leak, but then normal on review. Currently, pt doing well with no discomfort unless he eats fried or greasy foods. If he does eat greasy, he had epig discomfort, sense of fullness, and burping. He has lost 14# since last visit.  He was having muscle aches and was given steroid taper and feels better.

## 2024-06-11 NOTE — PHYSICAL EXAM CONSTITUTIONAL:
well developed, cachectic, in no acute distress , ambulating without difficulty , normal communication ability - son translating

## 2024-07-19 ENCOUNTER — OFFICE VISIT (OUTPATIENT)
Dept: URBAN - METROPOLITAN AREA CLINIC 118 | Facility: CLINIC | Age: 64
End: 2024-07-19

## 2024-07-29 ENCOUNTER — OFFICE VISIT (OUTPATIENT)
Dept: URBAN - METROPOLITAN AREA CLINIC 70 | Facility: CLINIC | Age: 64
End: 2024-07-29

## 2024-08-19 ENCOUNTER — OFFICE VISIT (OUTPATIENT)
Dept: URBAN - METROPOLITAN AREA CLINIC 70 | Facility: CLINIC | Age: 64
End: 2024-08-19

## 2024-08-19 RX ORDER — FENOFIBRATE 145 MG/1
1 TABLET TABLET, FILM COATED ORAL ONCE A DAY
Status: ACTIVE | COMMUNITY

## 2024-08-19 RX ORDER — PANTOPRAZOLE SODIUM 40 MG/1
1 TABLET TABLET, DELAYED RELEASE ORAL ONCE A DAY
Status: ACTIVE | COMMUNITY

## 2024-08-19 RX ORDER — PRAVASTATIN SODIUM 40 MG/1
TAKE ONE TABLET BY MOUTH AT BEDTIME DIAGNOSIS UNAVAILABLE TABLET ORAL
Qty: 90 | Refills: 0 | Status: ON HOLD | COMMUNITY

## 2024-08-28 ENCOUNTER — OFFICE VISIT (OUTPATIENT)
Dept: URBAN - METROPOLITAN AREA CLINIC 70 | Facility: CLINIC | Age: 64
End: 2024-08-28

## 2024-09-11 ENCOUNTER — OFFICE VISIT (OUTPATIENT)
Dept: URBAN - METROPOLITAN AREA CLINIC 70 | Facility: CLINIC | Age: 64
End: 2024-09-11